# Patient Record
Sex: MALE | Race: WHITE | Employment: FULL TIME | URBAN - METROPOLITAN AREA
[De-identification: names, ages, dates, MRNs, and addresses within clinical notes are randomized per-mention and may not be internally consistent; named-entity substitution may affect disease eponyms.]

---

## 2023-03-16 ENCOUNTER — OFFICE VISIT (OUTPATIENT)
Dept: ORTHOPEDIC SURGERY | Age: 28
End: 2023-03-16

## 2023-03-16 VITALS — BODY MASS INDEX: 26.78 KG/M2 | WEIGHT: 170.6 LBS | HEIGHT: 67 IN

## 2023-03-16 DIAGNOSIS — M54.16 LUMBAR RADICULOPATHY: ICD-10-CM

## 2023-03-16 DIAGNOSIS — M54.50 LOW BACK PAIN, UNSPECIFIED BACK PAIN LATERALITY, UNSPECIFIED CHRONICITY, UNSPECIFIED WHETHER SCIATICA PRESENT: Primary | ICD-10-CM

## 2023-03-16 RX ORDER — METHYLPREDNISOLONE 4 MG/1
TABLET ORAL
Qty: 1 KIT | Refills: 0 | Status: SHIPPED | OUTPATIENT
Start: 2023-03-16

## 2023-03-16 RX ORDER — DICLOFENAC SODIUM 75 MG/1
75 TABLET, DELAYED RELEASE ORAL 2 TIMES DAILY PRN
Qty: 60 TABLET | Refills: 0 | Status: SHIPPED | OUTPATIENT
Start: 2023-03-16

## 2023-03-16 NOTE — PROGRESS NOTES
Name: Ashley  YOB: 1995  Gender: male  MRN: [id number]    CC: Acute onset of lower back and right leg pain    HPI: This is a 33y.o. year old male who had a 1 month history of acute onset of lower back pain radiating into the right buttock and down the posterior leg. Initially his pain was significant it is now more of a nuisance but is still interfering with his daily activities. At times he has difficulty putting his shoes on. He denies any bladder or bowel changes. It is kept him from exercising as he would like to. The patient denies any change in bowel or bladder function since the onset of the symptoms. he  has not had lumbar surgery in the past.      Thus far, the patient has tried NSAIDS    Current pain level: Activities limited by pain:        AMB PAIN ASSESSMENT 3/16/2023   Location of Pain Back   Location Modifiers Right;Left   Severity of Pain 5   Frequency of Pain Intermittent   Limiting Behavior Yes   Result of Injury No   Work-Related Injury No   Are there other pain locations you wish to document? No            ROS/Meds/PSH/PMH/FH/SH: I personally reviewed the patient's collected intake data. Below are the pertinents:    No Known Allergies      Current Outpatient Medications:     methylPREDNISolone (MEDROL DOSEPACK) 4 MG tablet, Take by mouth as directed. Start in morning, Disp: 1 kit, Rfl: 0    diclofenac (VOLTAREN) 75 MG EC tablet, Take 1 tablet by mouth 2 times daily as needed for Pain, Disp: 60 tablet, Rfl: 0    No past surgical history on file. There is no problem list on file for this patient. Tobacco:  has no history on file for tobacco use. Alcohol:   Social History     Substance and Sexual Activity   Alcohol Use Not on file          Physical Exam:   BMI: Body mass index is 27.12 kg/m².     GENERAL:  Adult in no acute distress, well developed, well nourished Patient is appropriately conversant  MSK:  Examination of the lumbar spine reveals spinal tenderness    There is mild tenderness to palpation along the spinous processes and paraspinal musculature. The patient ambulates with a normal gait. ROM of bilateral hip(s) reveals no irritability. NEURO:  Cranial nerves grossly intact. No motor deficits. Straight leg testing is positive right  Sensory testing reveals intact sensation to light touch and in the distribution of the L3-S1 dermatomes bilaterally  Ankle jerk is negative for clonus    Reflexes   Right Left   Quadriceps (L4) 2 2   Achilles (S1) 2 2     Strength testing in the lower extremity reveals the following based on the 5 point grading scale:     HF (L2) H Ab (L5) KE (L3/4) ADF (L4) EHL (L5) A Ev (S1) APF (S1)   Right 5 5 5 5 5 5 5   Left 5 5 5 5 5 5 5     PSYCH:  Alert and oriented X 3. Appropriate affect. Intact judgment and insight. Radiographic Studies:     AP, lateral and spot views of the lumbar spine:      Intervertebral disc bases are well-preserved. No fractures or lesions. Normal alignment. Interpretation: Negative lumbar spine. Assessment/Plan:       Diagnosis Orders   1. Low back pain, unspecified back pain laterality, unspecified chronicity, unspecified whether sciatica present  XR LUMBAR SPINE (2-3 VIEWS)      2. Lumbar radiculopathy            This patient's clinical history and physical exam is consistent with a right  L-5 and S-1 lumbar radiculopathy. We discussed the natural history of lumbar radiculopathy in that many of these patients have near complete resolution of their symptoms within eight to twelve weeks with conservative care. We discussed that conservative treatments typically start with activity modification, and medication followed by physical therapy as symptoms allow. Oral and/or epidural steroids are other options. I also discussed potential surgical options if the symptoms fail to improve or there is a progressive neurologic deficit and conservative management has been exhausted. We discussed that surgery is not typically a reliable treatment for isolated back pain, but is usually very reliable in relieving buttock and leg symptoms.        - A home exercise program was prescribed for stretching and strengthening. A list of exercises was provided. - If the patient fails to respond to these efforts, I would recommend MRI of the lumbar spine for further evaluation.  - NSAID: The patient is agreeable to a trial of nonsteroidal anti-inflammatory drugs (NSAIDs). We discussed risks associated with their use, including GI tract or other bleeding, and also some cardiac risk. Instructions were given to discontinue the NSAID if there is any sign of GI bleed, chest pain, or shortness of breath. Continued use of NSAIDS is recommended to be managed by PCP to monitor kidney and liver function.  - Oral Steroids: A short course of oral steroids was prescribed in an attempt to bring the acute radicular symptoms under control. The patient understands the risks and side effects of oral steroids including immunosuppression, hypertension, mood swings, increased blood sugar, including glaucoma. The steroid taper can be followed by NSAIDs once completed. 4 This is a undiagnosed new problem with uncertain prognosis    Orders Placed This Encounter   Medications    methylPREDNISolone (MEDROL DOSEPACK) 4 MG tablet     Sig: Take by mouth as directed. Start in morning     Dispense:  1 kit     Refill:  0    diclofenac (VOLTAREN) 75 MG EC tablet     Sig: Take 1 tablet by mouth 2 times daily as needed for Pain     Dispense:  60 tablet     Refill:  0        Orders Placed This Encounter   Procedures    XR LUMBAR SPINE (2-3 VIEWS)            Return in about 6 weeks (around 4/27/2023). TOR James - CNP  03/16/23      Elements of this note were created using speech recognition software. As such, errors of speech recognition may be present.

## 2023-05-04 ENCOUNTER — OFFICE VISIT (OUTPATIENT)
Dept: ORTHOPEDIC SURGERY | Age: 28
End: 2023-05-04
Payer: COMMERCIAL

## 2023-05-04 DIAGNOSIS — M54.16 LUMBAR RADICULOPATHY: Primary | ICD-10-CM

## 2023-05-04 DIAGNOSIS — M54.50 LOW BACK PAIN, UNSPECIFIED BACK PAIN LATERALITY, UNSPECIFIED CHRONICITY, UNSPECIFIED WHETHER SCIATICA PRESENT: ICD-10-CM

## 2023-05-04 PROCEDURE — 99213 OFFICE O/P EST LOW 20 MIN: CPT | Performed by: NURSE PRACTITIONER

## 2023-05-04 NOTE — PROGRESS NOTES
Name: Ashley  YOB: 1995  Gender: male  MRN: [id number]    CC: Follow-up low back, right leg pain, intermittent left cubital tunnel symptoms     HPI: This is a 33y.o. year old male who has had greater now than almost 3-month history of a cute onset of lower back pain rating into the right buttock and down the posterior leg. Pain has been interfering with his daily activities. At last visit we tried steroids and diclofenac. He has noticed a slight improvement. X-rays of the lumbar spine were negative for any abnormal findings. Thus far, the patient has tried NSAIDS, oral steroids, and physician directed home exercise program.    Patient has completed a home exercise program under my care for 6 weeks, from 3/16/2023 to 5/4/2023 performing exercises 5-6 times a week. These exercises included: Pelvic tilt, cat and camel, single knee-to-chest, double knee-to-chest, lower trunk rotation, pelvic bridging, prone on elbows, prone on extended arms, standing back extensions, side bend, prone upper extremity exercise, prone lower extremity exercise. Medications trialed: Diclofenac, Medrol Dosepak    Current pain level: 5-6  Activities limited by pain: Sitting, standing, bending    Has also had intermittent symptoms of numbness and tingling starting at the elbow on his left arm down into the last 2 fingers. It happens usually when it is very cold out he goes to reach for something. Denies neck pain. AMB PAIN ASSESSMENT 3/16/2023   Location of Pain Back   Location Modifiers Right;Left   Severity of Pain 5   Frequency of Pain Intermittent   Limiting Behavior Yes   Result of Injury No   Work-Related Injury No   Are there other pain locations you wish to document? No            No Known Allergies    Current Outpatient Medications:     methylPREDNISolone (MEDROL DOSEPACK) 4 MG tablet, Take by mouth as directed.  Start in morning, Disp: 1 kit, Rfl: 0    diclofenac (VOLTAREN) 75 MG EC tablet,

## 2023-05-12 ENCOUNTER — OFFICE VISIT (OUTPATIENT)
Dept: ORTHOPEDIC SURGERY | Age: 28
End: 2023-05-12
Payer: COMMERCIAL

## 2023-05-12 DIAGNOSIS — G56.22 CUBITAL TUNNEL SYNDROME ON LEFT: ICD-10-CM

## 2023-05-12 DIAGNOSIS — G57.01 PIRIFORMIS SYNDROME OF RIGHT SIDE: Primary | ICD-10-CM

## 2023-05-12 PROCEDURE — 99213 OFFICE O/P EST LOW 20 MIN: CPT | Performed by: NURSE PRACTITIONER

## 2023-05-12 NOTE — PROGRESS NOTES
Agitated and crying, moving all extremities     Huber Gillespie RN  05/03/23 [id number] Name: Ashley  YOB: 1995  Gender: male  MRN: [id number]    CC: Follow-up low back right leg pain, intermittent left cubital tunnel symptoms     HPI: This is a 33y.o. year old male who has been under my care for complaint of pain in the lower back right buttock down the posterior leg. It is worse when he is driving. He is exhausted conservative treatment which included home exercises, NSAIDs and oral steroids. He is really not taking the diclofenac daily. Also at last visit he discussed having pain in the elbow and numbness down into the last 2 fingers on his left hand. At last visit I referred patient for an MRI of the lumbar spine           AMB PAIN ASSESSMENT 3/16/2023   Location of Pain Back   Location Modifiers Right;Left   Severity of Pain 5   Frequency of Pain Intermittent   Limiting Behavior Yes   Result of Injury No   Work-Related Injury No   Are there other pain locations you wish to document? No            No Known Allergies    Current Outpatient Medications:     methylPREDNISolone (MEDROL DOSEPACK) 4 MG tablet, Take by mouth as directed. Start in morning, Disp: 1 kit, Rfl: 0    diclofenac (VOLTAREN) 75 MG EC tablet, Take 1 tablet by mouth 2 times daily as needed for Pain, Disp: 60 tablet, Rfl: 0  No past medical history on file. Tobacco:  has no history on file for tobacco use. Alcohol:   Social History     Substance and Sexual Activity   Alcohol Use Not on file        Pertinent Physical Exam:           Radiographic Studies:       MRI Results (most recent): MRI Result (most recent): MRI LUMBAR SPINE WO CONTRAST 05/11/2023    Narrative  EXAMINATION: MRI LUMBAR SPINE WO CONTRAST 5/11/2023 8:48 AM    ACCESSION NUMBER: [id number]    COMPARISON: MR spine radiographs 3/16/2023. INDICATION: Lumbar radiculopathy; low back pain with right leg pain and tingling  for 3 months.     TECHNIQUE: Multisequence, multiplanar MR images were obtained of the lumbar  spine without the

## 2023-05-22 ENCOUNTER — OFFICE VISIT (OUTPATIENT)
Dept: ORTHOPEDIC SURGERY | Age: 28
End: 2023-05-22

## 2023-05-22 DIAGNOSIS — G56.22 CUBITAL TUNNEL SYNDROME ON LEFT: Primary | ICD-10-CM

## 2023-05-22 NOTE — PROGRESS NOTES
Orthopaedic Hand Surgery Note    Name: Trace Shepard  YOB: 1995  Gender: male  MRN: [id number]    CC: New patient referred for hand numbness      HPI: Patient is a 33 y.o. male with a chief complaint of left hand numbness and tingling in the ulnar nerve distribution. He gets a shocking sensation when he moves the elbows, this only occurs occasionally. The symptoms have been going on for months. The patient does not complain of night wakening and increased symptoms with driving. Evaluation to date has included none. Treatment to date has included none. ROS/Meds/PSH/PMH/FH/SH: I personally reviewed the patients standard intake form. Pertinents are discussed In the HPI    Physical Examination:    Musculoskeletal:     Examination of the bilateral upper extremity demonstrates Normal sensation to light touch in the ulnar distribution, normal sensation in median and radial distribution, negative carpal tunnel compression testing and Phalen testing, cap refill < 5 seconds in all fingers. Inspection reveals no thenar atrophy, no interosseous atrophy. equivocal Tinel and elbow flexion compression test of the cubital tunnel, negative Tinel over Guyon's canal. negative Wartenberg sign, no ulnar clawing. No tenderness to palpation or masses noted in the forearm. Imaging / Electrodiagnostic Tests:     none    Assessment:     ICD-10-CM    1. Cubital tunnel syndrome on left  G56.22 Ambulatory referral to Occupational Therapy     Ambulatory Referral to DME          Plan:  We discussed the diagnosis and different treatment options. We discussed observation, EMG/NCV, night splinting, cortisone injections and surgical decompression and the risks and benefits of all were clearly outlined. After discussing in detail the patient elects to proceed with referral to OT, use of a cubital tunnel brace at night. If this fails to improve symptoms, he will follow up in 6 weeks.  My next step would be to obtain a nerve

## 2023-05-22 NOTE — PROGRESS NOTES
The patient was prescribed and fitted with Bilateral Gelrod elbow braces to wear at night to sleep in. He was shown how to remove the rods, but was explained that these make the brace more efficient. Patient read and signed documenting they understand and agree to [facility]'s current DME return policy.

## 2023-06-07 ENCOUNTER — EVALUATION (OUTPATIENT)
Age: 28
End: 2023-06-07
Payer: COMMERCIAL

## 2023-06-07 DIAGNOSIS — G56.22 CUBITAL TUNNEL SYNDROME ON LEFT: Primary | ICD-10-CM

## 2023-06-07 DIAGNOSIS — R29.898 UPPER EXTREMITY WEAKNESS: ICD-10-CM

## 2023-06-07 DIAGNOSIS — M25.522 LEFT ELBOW PAIN: ICD-10-CM

## 2023-06-07 DIAGNOSIS — M25.622 STIFFNESS OF LEFT ELBOW JOINT: ICD-10-CM

## 2023-06-07 PROCEDURE — 97110 THERAPEUTIC EXERCISES: CPT | Performed by: OCCUPATIONAL THERAPIST

## 2023-06-07 PROCEDURE — 97165 OT EVAL LOW COMPLEX 30 MIN: CPT | Performed by: OCCUPATIONAL THERAPIST

## 2023-06-07 NOTE — PROGRESS NOTES
for Manual Therapy Techniques for soft tissue mobilization, facilitation of muscles, pain management, lymphatic management, swelling management, scar mobilization, myofascial correction, mechanical joint correction or support, (90783) Kineseotaping for Therapeutic Procedure/Exercise  for strengthening or lengthening a muscle. Used in conjunction with retraining posture, endurance and flexibility, and Dry Needling:  Stimulating underlying myofascial trigger points, muscular and connective tissues for the management of neuromusculoskeletal pain and movement impairment      The referring physician has reviewed and approved this evaluation and plan of care as noted by the electronic signature attached to note. GOALS      Short Term Goals: 4/11/2023  (4 weeks)  Patient will be I with HEP including postural/scapular stabilization strengthening and ROM of the elbow/wrist and hand  Pt will report decrease paresthesias/pain in ulnar nerve distribution and median nerve distribution of elbow and hand and independence with nerve flossing/verbalize understanding of ergonomics. Patient will voice 2 to 3 ergonomic positional/work modifications  being utilized during daily tasks to prevent injury and strain to the affected elbow  Patient will be I with orthosis ming/doff, precautions and rationale for use at night time (as needed during the daytime with aggravating activities)  Patient will report pain no greater than 1-2/10 with driving/opening jars which has previously been opened with  the affected elbow  Pt will improve Quick DASH score indicating a functional deficit of 35% or less. Long term goals: 5/9/2023  (8 weeks)  Pt will report ability to work at desk and performing full elbow AROM without sustained pain > 1-2/10 to increase independence with work/cooking/driving  Pt will demonstrate  strength to 45 psi in order to open tight jars/containers independently.    Pt will be able to lift and carry >8 lbs with c/o

## 2023-06-21 ENCOUNTER — TREATMENT (OUTPATIENT)
Age: 28
End: 2023-06-21

## 2023-06-21 DIAGNOSIS — R29.898 UPPER EXTREMITY WEAKNESS: ICD-10-CM

## 2023-06-21 DIAGNOSIS — M25.622 STIFFNESS OF LEFT ELBOW JOINT: ICD-10-CM

## 2023-06-21 DIAGNOSIS — M25.522 LEFT ELBOW PAIN: ICD-10-CM

## 2023-06-21 DIAGNOSIS — G56.22 CUBITAL TUNNEL SYNDROME ON LEFT: Primary | ICD-10-CM

## 2023-06-21 NOTE — PROGRESS NOTES
and hand and independence with nerve flossing/verbalize understanding of ergonomics. MET 6/21/23  Patient will voice 2 to 3 ergonomic positional/work modifications  being utilized during daily tasks to prevent injury and strain to the affected elbow   MET 6/21/23  Patient will be I with orthosis ming/doff, precautions and rationale for use at night time (as needed during the daytime with aggravating activities)  MET 6/21/23  Patient will report pain no greater than 1-2/10 with driving/opening jars which has previously been opened with  the affected elbow  MET 6/21/23  Pt will improve Quick DASH score indicating a functional deficit of 35% or less. MET 6/21/23     Long term goals: 5/9/2023  (8 weeks)  Pt will report ability to work at desk and performing full elbow AROM without sustained pain > 1-2/10 to increase independence with work/cooking/driving    MET [birth year]  Pt will demonstrate  strength to 45 psi in order to open tight jars/containers independently. MET 6/21/23  Pt will be able to lift and carry >8 lbs with c/o pain no greater than a 2/10. MET 6/21/23  Pt will improve Quick DASH score indicating a functional deficit of 19% or less   MET 6/21/23  Pt will be I with HEP for ROM and strengthening as indicated at time of discharge.     MET 6/21/23     [facility] Portal      OT Protocols

## 2023-08-28 ENCOUNTER — OFFICE VISIT (OUTPATIENT)
Dept: ORTHOPEDIC SURGERY | Age: 28
End: 2023-08-28

## 2023-08-28 VITALS — BODY MASS INDEX: 25.9 KG/M2 | HEIGHT: 67 IN | WEIGHT: 165 LBS

## 2023-08-28 DIAGNOSIS — G56.30 WARTENBERG SYNDROME: Primary | ICD-10-CM

## 2023-08-28 DIAGNOSIS — G56.22 CUBITAL TUNNEL SYNDROME ON LEFT: ICD-10-CM

## 2023-08-28 RX ORDER — BETAMETHASONE SODIUM PHOSPHATE AND BETAMETHASONE ACETATE 3; 3 MG/ML; MG/ML
6 INJECTION, SUSPENSION INTRA-ARTICULAR; INTRALESIONAL; INTRAMUSCULAR; SOFT TISSUE ONCE
Status: COMPLETED | OUTPATIENT
Start: 2023-08-28 | End: 2023-08-29

## 2023-08-28 NOTE — PROGRESS NOTES
discussed. Risks including skin blanching, subcutaneous fat atrophy, and elevations in blood glucose levels were discussed. The patient consented for an injection. The patient has been identified by name and birthdate. The injection site was identified, marked and prepped with alcohol swabs. Time out completed. Ultrasound guidance was used to localize the SBRN at the site of the positive tinel's sign and to guide needle placement. The bilateral sensory branch of the radial nerve was injected with 1ml of 6mg/ml Betamethasone and 1ml Lidocaine plain 1%. The injection site was then dressed with a bandaid. The patient tolerated the injection well. The patient was instructed to monitor their blood sugars if diabetic and call if any concerns. The patient was instructed to call the office if any adverse local effects occurred or any if any questions or concerns arise. Patient voiced accordance and understanding of the information provided and the formulated plan. All questions were answered to the patient's satisfaction during the encounter.       Tiffany Bautista MD  Orthopaedic Surgery  08/28/23  7:11 PM

## 2023-08-29 RX ADMIN — BETAMETHASONE SODIUM PHOSPHATE AND BETAMETHASONE ACETATE 6 MG: 3; 3 INJECTION, SUSPENSION INTRA-ARTICULAR; INTRALESIONAL; INTRAMUSCULAR; SOFT TISSUE at 13:09
